# Patient Record
Sex: MALE | URBAN - METROPOLITAN AREA
[De-identification: names, ages, dates, MRNs, and addresses within clinical notes are randomized per-mention and may not be internally consistent; named-entity substitution may affect disease eponyms.]

---

## 2024-01-04 ENCOUNTER — TELEPHONE (OUTPATIENT)
Age: 69
End: 2024-01-04

## 2024-01-04 NOTE — TELEPHONE ENCOUNTER
Pt called back, Pt had to make another call and will reach out to schedule appt once decided on a date and time

## 2024-01-04 NOTE — TELEPHONE ENCOUNTER
Pt in the middle of giving all his info but decided to take another call. Must finish creating chart.